# Patient Record
Sex: FEMALE | Race: BLACK OR AFRICAN AMERICAN | NOT HISPANIC OR LATINO | URBAN - METROPOLITAN AREA
[De-identification: names, ages, dates, MRNs, and addresses within clinical notes are randomized per-mention and may not be internally consistent; named-entity substitution may affect disease eponyms.]

---

## 2024-02-14 ENCOUNTER — OFFICE VISIT (OUTPATIENT)
Dept: OPHTHALMOLOGY | Facility: CLINIC | Age: 49
End: 2024-02-14
Payer: COMMERCIAL

## 2024-02-14 DIAGNOSIS — H52.223 REGULAR ASTIGMATISM OF BOTH EYES: ICD-10-CM

## 2024-02-14 DIAGNOSIS — H52.4 PRESBYOPIA: ICD-10-CM

## 2024-02-14 DIAGNOSIS — H53.8 BLURRED VISION: Primary | ICD-10-CM

## 2024-02-14 DIAGNOSIS — H52.13 MYOPIA, BILATERAL: ICD-10-CM

## 2024-02-14 PROCEDURE — 92004 COMPRE OPH EXAM NEW PT 1/>: CPT | Performed by: OPHTHALMOLOGY

## 2024-02-14 PROCEDURE — 92015 DETERMINE REFRACTIVE STATE: CPT | Performed by: OPHTHALMOLOGY

## 2024-02-14 ASSESSMENT — SLIT LAMP EXAM - LIDS
COMMENTS: GOOD POSITION
COMMENTS: GOOD POSITION

## 2024-02-14 ASSESSMENT — TONOMETRY
IOP_METHOD: GOLDMANN APPLANATION
OD_IOP_MMHG: 16
OS_IOP_MMHG: 14

## 2024-02-14 ASSESSMENT — REFRACTION_MANIFEST
OS_CYLINDER: -0.50
OD_CYLINDER: -0.25
OS_AXIS: 160
OD_ADD: +1.75
OS_SPHERE: -3.75
OS_ADD: +1.75
OD_SPHERE: -3.25
OD_AXIS: 065

## 2024-02-14 ASSESSMENT — REFRACTION_WEARINGRX
OS_CYLINDER: -0.75
OD_CYLINDER: -0.25
OS_SPHERE: -3.00
OD_AXIS: 065
OD_SPHERE: -3.25
OS_AXIS: 160

## 2024-02-14 ASSESSMENT — EXTERNAL EXAM - RIGHT EYE: OD_EXAM: NORMAL

## 2024-02-14 ASSESSMENT — VISUAL ACUITY
METHOD: SNELLEN - LINEAR
OS_CC: 20/25
OD_CC: 20/20-2

## 2024-02-14 ASSESSMENT — CUP TO DISC RATIO
OS_RATIO: 0.3
OD_RATIO: 0.3

## 2024-02-14 ASSESSMENT — EXTERNAL EXAM - LEFT EYE: OS_EXAM: NORMAL

## 2024-02-14 ASSESSMENT — ENCOUNTER SYMPTOMS: EYES NEGATIVE: 1

## 2024-02-14 NOTE — PROGRESS NOTES
Assessment/Plan   Diagnoses and all orders for this visit:  Blurred vision    Myopia, bilateral  Regular astigmatism of both eyes  Presbyopiaf    Refractive error  -give Rx for new glasses    Refer to Optometry service for contact lens evaluation and fitting    Return in  12  month(s) for follow up or sooner if having any problems

## 2025-02-19 ENCOUNTER — APPOINTMENT (OUTPATIENT)
Dept: OPHTHALMOLOGY | Facility: CLINIC | Age: 50
End: 2025-02-19
Payer: COMMERCIAL

## 2025-04-24 ENCOUNTER — APPOINTMENT (OUTPATIENT)
Dept: OPHTHALMOLOGY | Facility: CLINIC | Age: 50
End: 2025-04-24
Payer: COMMERCIAL

## 2025-04-24 DIAGNOSIS — H52.223 REGULAR ASTIGMATISM OF BOTH EYES: ICD-10-CM

## 2025-04-24 DIAGNOSIS — H53.8 BLURRED VISION: Primary | ICD-10-CM

## 2025-04-24 DIAGNOSIS — H52.4 PRESBYOPIA: ICD-10-CM

## 2025-04-24 DIAGNOSIS — H52.13 MYOPIA, BILATERAL: ICD-10-CM

## 2025-04-24 DIAGNOSIS — H25.813 COMBINED FORMS OF AGE-RELATED CATARACT OF BOTH EYES: ICD-10-CM

## 2025-04-24 PROCEDURE — 92014 COMPRE OPH EXAM EST PT 1/>: CPT | Performed by: OPHTHALMOLOGY

## 2025-04-24 PROCEDURE — 92015 DETERMINE REFRACTIVE STATE: CPT | Performed by: OPHTHALMOLOGY

## 2025-04-24 RX ORDER — CEPHALEXIN 500 MG/1
CAPSULE ORAL
COMMUNITY
Start: 2025-03-01

## 2025-04-24 RX ORDER — HYDROQUINONE 40 MG/G
CREAM TOPICAL
COMMUNITY
Start: 2024-07-16

## 2025-04-24 RX ORDER — MINOXIDIL 2.5 MG/1
2.5 TABLET ORAL DAILY
COMMUNITY

## 2025-04-24 RX ORDER — IBUPROFEN 600 MG/1
TABLET ORAL 4 TIMES DAILY
COMMUNITY
Start: 2020-11-06

## 2025-04-24 ASSESSMENT — VISUAL ACUITY
METHOD: SNELLEN - LINEAR
OD_CC: 20/20-1
OS_CC: 20/30-1

## 2025-04-24 ASSESSMENT — REFRACTION_MANIFEST
OS_AXIS: 160
OD_SPHERE: -3.25
OD_AXIS: 065
OS_ADD: +2.00
OS_SPHERE: -4.00
OD_ADD: +2.00
OS_CYLINDER: -0.25
OD_CYLINDER: -0.50

## 2025-04-24 ASSESSMENT — ENCOUNTER SYMPTOMS: EYES NEGATIVE: 1

## 2025-04-24 ASSESSMENT — SLIT LAMP EXAM - LIDS
COMMENTS: GOOD POSITION
COMMENTS: GOOD POSITION

## 2025-04-24 ASSESSMENT — CUP TO DISC RATIO
OS_RATIO: 0.3
OD_RATIO: 0.3

## 2025-04-24 ASSESSMENT — REFRACTION_WEARINGRX
OD_CYLINDER: -0.25
OD_ADD: +1.75
OS_CYLINDER: -0.75
OD_SPHERE: -3.25
OS_SPHERE: -3.25
OD_AXIS: 062
OS_AXIS: 156
OS_ADD: +1.75

## 2025-04-24 ASSESSMENT — TONOMETRY
OD_IOP_MMHG: 12
OS_IOP_MMHG: 12
IOP_METHOD: GOLDMANN APPLANATION

## 2025-04-24 ASSESSMENT — EXTERNAL EXAM - LEFT EYE: OS_EXAM: NORMAL

## 2025-04-24 ASSESSMENT — EXTERNAL EXAM - RIGHT EYE: OD_EXAM: NORMAL

## 2025-04-24 NOTE — PROGRESS NOTES
Assessment/Plan   Diagnoses and all orders for this visit:  Blurred vision  Improved with refraction    Combined forms of age-related cataract of both eyes  Not visually significant at the present time  continue to monitor    Myopia, bilateral  Regular astigmatism of both eyes  Presbyopia  Refractive error  -give Rx for new glasses    Return for a dilated exam in  12  months or sooner if having any problems

## 2025-06-05 ENCOUNTER — APPOINTMENT (OUTPATIENT)
Dept: OPHTHALMOLOGY | Facility: CLINIC | Age: 50
End: 2025-06-05
Payer: COMMERCIAL

## 2025-06-05 DIAGNOSIS — H52.13 MYOPIA OF BOTH EYES: Primary | ICD-10-CM

## 2025-06-05 PROCEDURE — FLVLF CONTACT LENS EVALUATION (SP): Performed by: STUDENT IN AN ORGANIZED HEALTH CARE EDUCATION/TRAINING PROGRAM

## 2025-06-05 ASSESSMENT — ENCOUNTER SYMPTOMS
EYES NEGATIVE: 1
CARDIOVASCULAR NEGATIVE: 0
CONSTITUTIONAL NEGATIVE: 0
ALLERGIC/IMMUNOLOGIC NEGATIVE: 0
HEMATOLOGIC/LYMPHATIC NEGATIVE: 0
PSYCHIATRIC NEGATIVE: 0
GASTROINTESTINAL NEGATIVE: 0
NEUROLOGICAL NEGATIVE: 0
ENDOCRINE NEGATIVE: 0
MUSCULOSKELETAL NEGATIVE: 0
RESPIRATORY NEGATIVE: 0

## 2025-06-05 ASSESSMENT — REFRACTION_CURRENTRX
OS_DIAMETER: 14.2
OD_DIAMETER: 14.2
OS_BRAND: ACUVUE MOIST 1 DAY
OD_BRAND: ACUVUE MOIST 1 DAY
OS_SPHERE: -3.50
OS_BASECURVE: 8.5
OD_CYLINDER: SPHERE
OD_BASECURVE: 8.5
OS_CYLINDER: SPHERE
OD_SPHERE: -1.25

## 2025-06-05 ASSESSMENT — REFRACTION_WEARINGRX
OS_SPHERE: -3.25
OD_CYLINDER: -0.25
OD_AXIS: 062
OD_SPHERE: -3.25
OS_ADD: +1.75
OS_AXIS: 156
OS_CYLINDER: -0.75
OD_ADD: +1.75

## 2025-06-05 ASSESSMENT — CONF VISUAL FIELD
OS_SUPERIOR_NASAL_RESTRICTION: 0
OD_SUPERIOR_TEMPORAL_RESTRICTION: 0
OD_INFERIOR_NASAL_RESTRICTION: 0
OS_SUPERIOR_TEMPORAL_RESTRICTION: 0
OS_INFERIOR_NASAL_RESTRICTION: 0
OD_INFERIOR_TEMPORAL_RESTRICTION: 0
METHOD: COUNTING FINGERS
OS_NORMAL: 1
OS_INFERIOR_TEMPORAL_RESTRICTION: 0
OD_NORMAL: 1
OD_SUPERIOR_NASAL_RESTRICTION: 0

## 2025-06-05 ASSESSMENT — VISUAL ACUITY
OS_CC+: -2
METHOD: SNELLEN - LINEAR
OD_CC+: -1
CORRECTION_TYPE: GLASSES
OS_CC: 20/25
OD_CC: 20/20

## 2025-06-05 ASSESSMENT — REFRACTION_MANIFEST
OD_CYLINDER: -0.25
OS_AXIS: 070
OD_ADD: +1.75
OS_ADD: +1.75
OD_AXIS: 067
OS_SPHERE: -4.00
OD_SPHERE: -3.25
OS_CYLINDER: -0.50

## 2025-06-05 ASSESSMENT — EXTERNAL EXAM - RIGHT EYE: OD_EXAM: NORMAL

## 2025-06-05 ASSESSMENT — EXTERNAL EXAM - LEFT EYE: OS_EXAM: NORMAL

## 2025-06-05 ASSESSMENT — SLIT LAMP EXAM - LIDS
COMMENTS: GOOD POSITION
COMMENTS: GOOD POSITION

## 2025-06-05 NOTE — PROGRESS NOTES
Assessment/Plan   Diagnoses and all orders for this visit:  Myopia of both eyes  -patient here for CL exam only-previously did well with AV moist 1 day CL monovision OD near OS distance  -Discussed proper wear, care, replacement of contact lenses. Gave handout. D/c cl wear and RTC if eyes become red, painful, irritated. Monitor 1 year.   CL eval billed today. $35 Finalized RX today    RTC for comprehensive exam with DFE, MRX, CL exam 1 year

## 2026-04-28 ENCOUNTER — APPOINTMENT (OUTPATIENT)
Dept: OPHTHALMOLOGY | Facility: CLINIC | Age: 51
End: 2026-04-28
Payer: COMMERCIAL